# Patient Record
Sex: FEMALE | Race: WHITE | NOT HISPANIC OR LATINO | URBAN - METROPOLITAN AREA
[De-identification: names, ages, dates, MRNs, and addresses within clinical notes are randomized per-mention and may not be internally consistent; named-entity substitution may affect disease eponyms.]

---

## 2007-10-15 LAB
PAP RECOMMENDATION EXT: NORMAL
PAP SMEAR: NORMAL

## 2014-06-09 LAB — BCS RECOMMENDATION EXT: NORMAL

## 2017-11-15 ENCOUNTER — HISTORICAL (OUTPATIENT)
Dept: RADIOLOGY | Facility: HOSPITAL | Age: 37
End: 2017-11-15

## 2022-04-07 ENCOUNTER — HISTORICAL (OUTPATIENT)
Dept: ADMINISTRATIVE | Facility: HOSPITAL | Age: 42
End: 2022-04-07

## 2022-04-24 VITALS
SYSTOLIC BLOOD PRESSURE: 132 MMHG | BODY MASS INDEX: 25.2 KG/M2 | OXYGEN SATURATION: 100 % | WEIGHT: 142.19 LBS | DIASTOLIC BLOOD PRESSURE: 87 MMHG | HEIGHT: 63 IN

## 2022-08-23 ENCOUNTER — HOSPITAL ENCOUNTER (EMERGENCY)
Facility: HOSPITAL | Age: 42
Discharge: HOME | End: 2022-08-23
Attending: FAMILY MEDICINE
Payer: MEDICAID

## 2022-08-23 VITALS
OXYGEN SATURATION: 98 % | SYSTOLIC BLOOD PRESSURE: 130 MMHG | TEMPERATURE: 99 F | DIASTOLIC BLOOD PRESSURE: 80 MMHG | BODY MASS INDEX: 27.42 KG/M2 | HEIGHT: 62 IN | WEIGHT: 149 LBS | RESPIRATION RATE: 20 BRPM | HEART RATE: 85 BPM

## 2022-08-23 DIAGNOSIS — J01.00 ACUTE NON-RECURRENT MAXILLARY SINUSITIS: Primary | ICD-10-CM

## 2022-08-23 LAB
FLUAV AG UPPER RESP QL IA.RAPID: NOT DETECTED
FLUBV AG UPPER RESP QL IA.RAPID: NOT DETECTED
SARS-COV-2 RNA RESP QL NAA+PROBE: NOT DETECTED
STREP A PCR (OHS): NOT DETECTED

## 2022-08-23 PROCEDURE — 99284 EMERGENCY DEPT VISIT MOD MDM: CPT | Mod: 25

## 2022-08-23 PROCEDURE — 87631 RESP VIRUS 3-5 TARGETS: CPT | Performed by: FAMILY MEDICINE

## 2022-08-23 PROCEDURE — 63600175 PHARM REV CODE 636 W HCPCS: Performed by: FAMILY MEDICINE

## 2022-08-23 PROCEDURE — 96372 THER/PROPH/DIAG INJ SC/IM: CPT | Performed by: FAMILY MEDICINE

## 2022-08-23 PROCEDURE — 87636 SARSCOV2 & INF A&B AMP PRB: CPT | Performed by: FAMILY MEDICINE

## 2022-08-23 RX ORDER — CETIRIZINE HYDROCHLORIDE 10 MG/1
5 TABLET ORAL NIGHTLY
Qty: 20 TABLET | Refills: 0 | Status: SHIPPED | OUTPATIENT
Start: 2022-08-23 | End: 2022-09-12

## 2022-08-23 RX ORDER — CLINDAMYCIN HYDROCHLORIDE 300 MG/1
300 CAPSULE ORAL EVERY 6 HOURS
Qty: 28 CAPSULE | Refills: 0 | Status: SHIPPED | OUTPATIENT
Start: 2022-08-23 | End: 2022-08-30

## 2022-08-23 RX ORDER — KETOROLAC TROMETHAMINE 30 MG/ML
60 INJECTION, SOLUTION INTRAMUSCULAR; INTRAVENOUS
Status: COMPLETED | OUTPATIENT
Start: 2022-08-23 | End: 2022-08-23

## 2022-08-23 RX ADMIN — KETOROLAC TROMETHAMINE 60 MG: 30 INJECTION, SOLUTION INTRAMUSCULAR; INTRAVENOUS at 01:08

## 2022-08-23 NOTE — Clinical Note
"Beau Alvaradovalentino Johnson was seen and treated in our emergency department on 8/23/2022.  She may return to work on 08/25/2022.       If you have any questions or concerns, please don't hesitate to call.      Nadira Winters MD"

## 2022-08-23 NOTE — ED PROVIDER NOTES
Encounter Date: 8/23/2022       History     Chief Complaint   Patient presents with    Headache     C/o bilat ear pain, headache, sore throat, cough- non productive, body aches and fever highest 102 at home.      This pt is a 42 year old female with no significant PMHX... states taht she has been having nasal congestion, headache, fevr and body aches  For the pasr 4 days... works with the public.    The history is provided by the patient.   URI  The primary symptoms include fever, fatigue and headaches. The current episode started just prior to arrival. This is a new problem. The problem has not changed since onset.The fever began just prior to arrival. The fever has been unchanged since its onset. The fever has been present for less than 1 day. The temperature was taken by no thermometer. The maximum temperature recorded prior to her arrival was unknown.   The fatigue began today. The fatigue has been unchanged since its onset. The fatigue is worsened by emotional stress.   The headache began today. The headache developed suddenly. Headache is a new problem. The pain from the headache is at a severity of 5/10.   Symptoms associated with the illness include facial pain, sinus pressure, congestion and rhinorrhea.     Review of patient's allergies indicates:   Allergen Reactions    Codeine     Pcn [penicillins]      No past medical history on file.  No past surgical history on file.  No family history on file.  Social History     Tobacco Use    Smoking status: Current Every Day Smoker     Packs/day: 1.00     Types: Cigarettes    Smokeless tobacco: Never Used   Substance Use Topics    Alcohol use: Not Currently    Drug use: Never     Review of Systems   Constitutional: Positive for fatigue and fever.   HENT: Positive for congestion, rhinorrhea and sinus pressure.    Respiratory: Negative.    Cardiovascular: Negative.    Endocrine: Negative.    Neurological: Positive for headaches.   Psychiatric/Behavioral:  Negative.    All other systems reviewed and are negative.      Physical Exam     Initial Vitals [08/23/22 1220]   BP Pulse Resp Temp SpO2   129/82 86 20 98.8 °F (37.1 °C) 97 %      MAP       --         Physical Exam    Constitutional: She appears well-developed.   HENT:   Head: Normocephalic.   Nose: Mucosal edema and rhinorrhea present.   Mouth/Throat: Mucous membranes are normal. Posterior oropharyngeal erythema present.   Eyes: Pupils are equal, round, and reactive to light.   Neck:   Normal range of motion.  Cardiovascular: Normal rate, regular rhythm and normal heart sounds.   Pulmonary/Chest: Breath sounds normal.   Abdominal: Abdomen is soft.   Musculoskeletal:         General: Normal range of motion.      Cervical back: Normal range of motion.     Neurological: She is alert and oriented to person, place, and time.   Skin: Skin is warm and dry.   Psychiatric: She has a normal mood and affect.         ED Course   Procedures  Labs Reviewed   COVID/FLU A&B PCR - Normal   STREP GROUP A BY PCR - Normal          Imaging Results    None          Medications   ketorolac injection 60 mg (60 mg Intramuscular Given 8/23/22 1315)     Medical Decision Making:   Initial Assessment:   Upper resp infection- nasal congestion, fever and body aches.  Differential Diagnosis:   COVID, flu, sinusitis,   Clinical Tests:   Lab Tests: Ordered and Reviewed  ED Management:  COVID, flu and strep tests negative                      Clinical Impression:   Final diagnoses:  [J01.00] Acute non-recurrent maxillary sinusitis (Primary)          ED Disposition Condition    Discharge Stable        ED Prescriptions     Medication Sig Dispense Start Date End Date Auth. Provider    clindamycin (CLEOCIN) 300 MG capsule Take 1 capsule (300 mg total) by mouth every 6 (six) hours. for 7 days 28 capsule 8/23/2022 8/30/2022 Nadira Winters MD    cetirizine (ZYRTEC) 10 MG tablet Take 0.5 tablets (5 mg total) by mouth every evening. for 20 doses  20 tablet 8/23/2022 9/12/2022 Nadira Winters MD        Follow-up Information     Follow up With Specialties Details Why Contact Info    PCP  Schedule an appointment as soon as possible for a visit              Nadira Winters MD  08/23/22 2960

## 2022-11-16 ENCOUNTER — HOSPITAL ENCOUNTER (EMERGENCY)
Facility: HOSPITAL | Age: 42
Discharge: HOME | End: 2022-11-16
Attending: FAMILY MEDICINE
Payer: MEDICAID

## 2022-11-16 VITALS
BODY MASS INDEX: 27.42 KG/M2 | WEIGHT: 149 LBS | TEMPERATURE: 98 F | RESPIRATION RATE: 20 BRPM | DIASTOLIC BLOOD PRESSURE: 75 MMHG | HEIGHT: 62 IN | HEART RATE: 60 BPM | OXYGEN SATURATION: 100 % | SYSTOLIC BLOOD PRESSURE: 120 MMHG

## 2022-11-16 DIAGNOSIS — R11.10 VOMITING: ICD-10-CM

## 2022-11-16 DIAGNOSIS — N30.01 ACUTE CYSTITIS WITH HEMATURIA: ICD-10-CM

## 2022-11-16 DIAGNOSIS — J11.1 INFLUENZA: Primary | ICD-10-CM

## 2022-11-16 LAB
ALBUMIN SERPL-MCNC: 4.6 GM/DL (ref 3.5–5)
ALBUMIN/GLOB SERPL: 1.4 RATIO (ref 1.1–2)
ALP SERPL-CCNC: 61 UNIT/L (ref 40–150)
ALT SERPL-CCNC: 11 UNIT/L (ref 0–55)
APPEARANCE UR: ABNORMAL
AST SERPL-CCNC: 12 UNIT/L (ref 5–34)
BACTERIA #/AREA URNS AUTO: ABNORMAL /HPF
BASOPHILS # BLD AUTO: 0.04 X10(3)/MCL (ref 0–0.2)
BASOPHILS NFR BLD AUTO: 0.4 %
BILIRUB UR QL STRIP.AUTO: NEGATIVE MG/DL
BILIRUBIN DIRECT+TOT PNL SERPL-MCNC: 0.9 MG/DL
BUN SERPL-MCNC: 9 MG/DL (ref 7–18.7)
CALCIUM SERPL-MCNC: 9.9 MG/DL (ref 8.4–10.2)
CHLORIDE SERPL-SCNC: 102 MMOL/L (ref 98–107)
CO2 SERPL-SCNC: 27 MMOL/L (ref 22–29)
COLOR UR AUTO: YELLOW
CREAT SERPL-MCNC: 0.78 MG/DL (ref 0.55–1.02)
EOSINOPHIL # BLD AUTO: 0.26 X10(3)/MCL (ref 0–0.9)
EOSINOPHIL NFR BLD AUTO: 2.7 %
ERYTHROCYTE [DISTWIDTH] IN BLOOD BY AUTOMATED COUNT: 12.4 % (ref 11.5–17)
GFR SERPLBLD CREATININE-BSD FMLA CKD-EPI: >60 MLS/MIN/1.73/M2
GLOBULIN SER-MCNC: 3.4 GM/DL (ref 2.4–3.5)
GLUCOSE SERPL-MCNC: 97 MG/DL (ref 74–100)
GLUCOSE UR QL STRIP.AUTO: NEGATIVE MG/DL
HCT VFR BLD AUTO: 47.9 % (ref 37–47)
HGB BLD-MCNC: 16.7 GM/DL (ref 12–16)
IMM GRANULOCYTES # BLD AUTO: 0.02 X10(3)/MCL (ref 0–0.04)
IMM GRANULOCYTES NFR BLD AUTO: 0.2 %
KETONES UR QL STRIP.AUTO: NEGATIVE MG/DL
LEUKOCYTE ESTERASE UR QL STRIP.AUTO: NEGATIVE UNIT/L
LYMPHOCYTES # BLD AUTO: 3.47 X10(3)/MCL (ref 0.6–4.6)
LYMPHOCYTES NFR BLD AUTO: 36.1 %
MCH RBC QN AUTO: 31.4 PG (ref 27–31)
MCHC RBC AUTO-ENTMCNC: 34.9 MG/DL (ref 33–36)
MCV RBC AUTO: 90 FL (ref 80–94)
MONOCYTES # BLD AUTO: 0.93 X10(3)/MCL (ref 0.1–1.3)
MONOCYTES NFR BLD AUTO: 9.7 %
NEUTROPHILS # BLD AUTO: 4.9 X10(3)/MCL (ref 2.1–9.2)
NEUTROPHILS NFR BLD AUTO: 50.9 %
NITRITE UR QL STRIP.AUTO: POSITIVE
NRBC BLD AUTO-RTO: 0 %
PH UR STRIP.AUTO: 7.5 [PH]
PLATELET # BLD AUTO: 291 X10(3)/MCL (ref 130–400)
PMV BLD AUTO: 11 FL (ref 7.4–10.4)
POTASSIUM SERPL-SCNC: 3.7 MMOL/L (ref 3.5–5.1)
PROT SERPL-MCNC: 8 GM/DL (ref 6.4–8.3)
PROT UR QL STRIP.AUTO: NEGATIVE MG/DL
RBC # BLD AUTO: 5.32 X10(6)/MCL (ref 4.2–5.4)
RBC #/AREA URNS AUTO: ABNORMAL /HPF
RBC UR QL AUTO: ABNORMAL UNIT/L
SODIUM SERPL-SCNC: 143 MMOL/L (ref 136–145)
SP GR UR STRIP.AUTO: 1.01
SQUAMOUS #/AREA URNS AUTO: ABNORMAL /HPF
UROBILINOGEN UR STRIP-ACNC: 0.2 MG/DL
WBC # SPEC AUTO: 9.6 X10(3)/MCL (ref 4.5–11.5)
WBC #/AREA URNS AUTO: ABNORMAL /HPF

## 2022-11-16 PROCEDURE — 85025 COMPLETE CBC W/AUTO DIFF WBC: CPT | Performed by: FAMILY MEDICINE

## 2022-11-16 PROCEDURE — 63600175 PHARM REV CODE 636 W HCPCS: Performed by: FAMILY MEDICINE

## 2022-11-16 PROCEDURE — 93005 ELECTROCARDIOGRAM TRACING: CPT

## 2022-11-16 PROCEDURE — 96375 TX/PRO/DX INJ NEW DRUG ADDON: CPT

## 2022-11-16 PROCEDURE — 96374 THER/PROPH/DIAG INJ IV PUSH: CPT

## 2022-11-16 PROCEDURE — 80053 COMPREHEN METABOLIC PANEL: CPT | Performed by: FAMILY MEDICINE

## 2022-11-16 PROCEDURE — 81003 URINALYSIS AUTO W/O SCOPE: CPT | Performed by: FAMILY MEDICINE

## 2022-11-16 PROCEDURE — 99285 EMERGENCY DEPT VISIT HI MDM: CPT | Mod: 25

## 2022-11-16 PROCEDURE — 25000003 PHARM REV CODE 250: Performed by: FAMILY MEDICINE

## 2022-11-16 PROCEDURE — 81001 URINALYSIS AUTO W/SCOPE: CPT | Performed by: FAMILY MEDICINE

## 2022-11-16 RX ORDER — ONDANSETRON 2 MG/ML
4 INJECTION INTRAMUSCULAR; INTRAVENOUS
Status: COMPLETED | OUTPATIENT
Start: 2022-11-16 | End: 2022-11-16

## 2022-11-16 RX ORDER — SODIUM CHLORIDE 9 MG/ML
1000 INJECTION, SOLUTION INTRAVENOUS
Status: COMPLETED | OUTPATIENT
Start: 2022-11-16 | End: 2022-11-16

## 2022-11-16 RX ORDER — DIPHENHYDRAMINE HYDROCHLORIDE 50 MG/ML
12.5 INJECTION INTRAMUSCULAR; INTRAVENOUS ONCE
Status: COMPLETED | OUTPATIENT
Start: 2022-11-16 | End: 2022-11-16

## 2022-11-16 RX ORDER — CIPROFLOXACIN 500 MG/1
500 TABLET ORAL 2 TIMES DAILY
Qty: 14 TABLET | Refills: 0 | Status: SHIPPED | OUTPATIENT
Start: 2022-11-16 | End: 2022-11-23

## 2022-11-16 RX ORDER — PROCHLORPERAZINE EDISYLATE 5 MG/ML
5 INJECTION INTRAMUSCULAR; INTRAVENOUS ONCE
Status: COMPLETED | OUTPATIENT
Start: 2022-11-16 | End: 2022-11-16

## 2022-11-16 RX ORDER — ONDANSETRON 4 MG/1
4 TABLET, FILM COATED ORAL EVERY 6 HOURS
Qty: 12 TABLET | Refills: 0 | Status: SHIPPED | OUTPATIENT
Start: 2022-11-16

## 2022-11-16 RX ADMIN — PROCHLORPERAZINE EDISYLATE 5 MG: 5 INJECTION INTRAMUSCULAR; INTRAVENOUS at 03:11

## 2022-11-16 RX ADMIN — ONDANSETRON HYDROCHLORIDE 4 MG: 2 SOLUTION INTRAMUSCULAR; INTRAVENOUS at 03:11

## 2022-11-16 RX ADMIN — DIPHENHYDRAMINE HYDROCHLORIDE 12.5 MG: 50 INJECTION, SOLUTION INTRAMUSCULAR; INTRAVENOUS at 03:11

## 2022-11-16 RX ADMIN — SODIUM CHLORIDE 1000 ML: 9 INJECTION, SOLUTION INTRAVENOUS at 02:11

## 2022-11-16 NOTE — ED PROVIDER NOTES
Encounter Date: 11/16/2022       History     Chief Complaint   Patient presents with    Headache    Vomiting     Headache, nausea, vomiting, sweating x 1 hour.       This patient is a 42-year-old who comes in for vomiting.  Patient states that she was diagnosed with influenza exactly 1 week ago.  Patient has been vomiting x 1 hr and unable to hold anything down.  She states that she does have dysuria and a headache.    The history is provided by the patient.   Headache   This is a new problem. The current episode started today. The problem occurs constantly. The problem has been unchanged. The pain is located in the Bilateral region. The pain does not radiate. The pain quality is similar to prior headaches. The pain is at a severity of 6/10. Associated symptoms include vomiting. Nothing aggravates the symptoms. She has tried nothing for the symptoms. The treatment provided no relief.   Vomiting   This is a new problem. The current episode started 1 to 2 hours ago. The problem has been unchanged. The emesis has an appearance of stomach contents. Associated symptoms include headaches.   Review of patient's allergies indicates:   Allergen Reactions    Codeine     Pcn [penicillins]      No past medical history on file.  No past surgical history on file.  No family history on file.  Social History     Tobacco Use    Smoking status: Every Day     Packs/day: 1.00     Types: Cigarettes    Smokeless tobacco: Never   Substance Use Topics    Alcohol use: Not Currently    Drug use: Never     Review of Systems   Constitutional: Negative.    HENT: Negative.     Respiratory: Negative.     Cardiovascular: Negative.    Gastrointestinal:  Positive for vomiting.   Endocrine: Negative.    Neurological:  Positive for headaches.   Psychiatric/Behavioral: Negative.     All other systems reviewed and are negative.    Physical Exam     Initial Vitals [11/16/22 1409]   BP Pulse Resp Temp SpO2   (!) 166/115 78 18 97.9 °F (36.6 °C) 100 %       MAP       --         Physical Exam    Nursing note and vitals reviewed.  Constitutional: She appears well-developed.   HENT:   Head: Normocephalic.   Eyes: Pupils are equal, round, and reactive to light.   Neck:   Normal range of motion.  Cardiovascular:  Normal rate, regular rhythm and normal heart sounds.           Pulmonary/Chest: Breath sounds normal.   Abdominal: Abdomen is soft.   Musculoskeletal:         General: Normal range of motion.      Cervical back: Normal range of motion.     Neurological: She is alert and oriented to person, place, and time.   Skin: Skin is warm and dry.   Psychiatric: She has a normal mood and affect.       ED Course   Procedures  Labs Reviewed   URINALYSIS, REFLEX TO URINE CULTURE - Abnormal; Notable for the following components:       Result Value    Appearance, UA SL CLOUDY (*)     Blood, UA 1+ (*)     Nitrites, UA Positive (*)     All other components within normal limits   CBC WITH DIFFERENTIAL - Abnormal; Notable for the following components:    Hgb 16.7 (*)     Hct 47.9 (*)     MCH 31.4 (*)     MPV 11.0 (*)     All other components within normal limits   URINALYSIS, MICROSCOPIC - Abnormal; Notable for the following components:    Bacteria, UA Many (*)     Squamous Epithelial Cells, UA Few (*)     All other components within normal limits   CBC W/ AUTO DIFFERENTIAL    Narrative:     The following orders were created for panel order CBC auto differential.  Procedure                               Abnormality         Status                     ---------                               -----------         ------                     CBC with Differential[[id number]]        Abnormal            Final result                 Please view results for these tests on the individual orders.   COMPREHENSIVE METABOLIC PANEL     EKG Readings: (Independently Interpreted)   Initial Reading: No STEMI. Rhythm: Normal Sinus Rhythm. Heart Rate: 62. Ectopy: No Ectopy. Conduction: Normal. ST Segments:  Normal ST Segments. Axis: Normal. Q Waves: V1 and V2. Clinical Impression: Normal Sinus Rhythm   ECG Results              EKG 12-lead (In process)  Result time 11/16/22 15:41:03      In process by Interface, Lab In [facility] (11/16/22 15:41:03)                   Narrative:    Test Reason : R11.10,    Vent. Rate : 062 BPM     Atrial Rate : 062 BPM     P-R Int : 170 ms          QRS Dur : 082 ms      QT Int : 422 ms       P-R-T Axes : 077 055 029 degrees     QTc Int : 428 ms    Normal sinus rhythm  Septal infarct ,age undetermined  Abnormal ECG  No previous ECGs available    Referred By: [email or web address]   SELF           Confirmed By:                                   Imaging Results              CT Head Without Contrast (Final result)  Result time 11/16/22 14:54:15      Final result by Pita Sharma MD (11/16/22 14:54:15)                   Impression:      No acute abnormality seen      Electronically signed by: Pita Sharma  Date:    11/16/2022  Time:    14:54               Narrative:    EXAMINATION:  CT HEAD WITHOUT CONTRAST    CLINICAL HISTORY:  Headache, new or worsening (Age >= 50y);    TECHNIQUE:  Multiple axial images were obtained from the base of the brain to the vertex without contrast administration.  Sagittal and coronal reconstructions were performed. .Automatic exposure control  (AEC) is utilized to reduce patient radiation exposure.    COMPARISON:  None    FINDINGS:  There is no intracranial mass or lesion seen.  No hemorrhage is seen.  No infarct is seen.  The ventricles and basilar cisterns appear normal.  Brain parenchyma appears grossly unremarkable.    Posterior fossa appears normal.  The calvarium is intact.  The paranasal sinuses appear grossly unremarkable.                                       Medications   0.9%  NaCl infusion (0 mLs Intravenous Stopped 11/16/22 1519)   ondansetron injection 4 mg (4 mg Intravenous Given 11/16/22 1504)   prochlorperazine injection Soln 5 mg (5 mg  Intravenous Given 11/16/22 1504)   diphenhydrAMINE injection 12.5 mg (12.5 mg Intravenous Given 11/16/22 1504)     Medical Decision Making:   Initial Assessment:   Patient is a 42-year-old female was been having a food for the past week and now she started vomiting approximately 1 hour ago and can not stop vomiting.  She admits dysuria  Differential Diagnosis:   Urinary tract infection, flu  Clinical Tests:   Lab Tests: Ordered and Reviewed  ED Management:  Patient found to have a urinary tract infection                        Clinical Impression:   Final diagnoses:  [R11.10] Vomiting  [J11.1] Influenza (Primary)  [N30.01] Acute cystitis with hematuria        ED Disposition Condition    Discharge Stable          ED Prescriptions       Medication Sig Dispense Start Date End Date Auth. Provider    ciprofloxacin HCl (CIPRO) 500 MG tablet Take 1 tablet (500 mg total) by mouth 2 (two) times daily. for 7 days 14 tablet 11/16/2022 11/23/2022 Nadira Winters MD    ondansetron (ZOFRAN) 4 MG tablet Take 1 tablet (4 mg total) by mouth every 6 (six) hours. 12 tablet 11/16/2022 -- Nadira Winters MD          Follow-up Information       Follow up With Specialties Details Why Contact Info    PCP  Schedule an appointment as soon as possible for a visit                Nadira Winters MD  11/16/22 1860

## 2022-11-16 NOTE — Clinical Note
"Beau Alvaradovalentino Johnson was seen and treated in our emergency department on 11/16/2022.  She may return to work on 11/17/2022.       If you have any questions or concerns, please don't hesitate to call.      Nadira Winters MD"

## 2022-11-16 NOTE — ED NOTES
Patient ambulated to room 3 with steady gait.  Stated that she was at work at [employer] when she had a headache that hit her.  Pt vomited upon arrival to the ER.  Stated that she was screwing on the the cap of a medication vile when head ache hit.  Patient mentioned she was diagnosed with the flu last Wednesday and returned to work because she was feeling better.  Patient rates pain a 10/10 on pain scale.

## 2022-11-18 ENCOUNTER — PATIENT OUTREACH (OUTPATIENT)
Dept: EMERGENCY MEDICINE | Facility: HOSPITAL | Age: 42
End: 2022-11-18
Payer: MEDICAID

## 2022-11-18 NOTE — PROGRESS NOTES
Spoke to for post ED visit. Pt reports she is currently at the [facility] and offered pt to call her back at another time, pt reports that she can talk. Pt verbal consent to enroll in [facility] and continue f/u calls. Pt also reports that she came to [facility] for still with headaches and her b/p was still elevated and [facility] will give rx for b/p meds for a month until she can get in with her pcp and was not able to get appt with pcp due they are closed today and she will call on Monday to obtain f/u appt and she has not seen pcp. Advised pt to follow discharge instructions. Discussed medication and dash diet compliance, benefits of pcp and to call and obtain f/u appt, ED utilization and to utilize [facility] for non emergency issues when pcp is unavailable, dental/vision care and stressed importance of f/u care with pcp and all providers and ancillary care. Pt request to mail sodh education/resource discussed to her, verified pt address with pt. Pt voices understanding to instructions given and appreciation.     Providers seen within the last year:   Jorgito Ramirez MD at [facility]

## 2022-11-18 NOTE — PATIENT INSTRUCTIONS
Why Should I Have My Own Doctor or Nurse Practitioner (PCP) to Take Care of Me  What is a PCP (Primary Care Provider)?    A primary care provider is a doctor or nurse practitioner who you can call for an appointment and will see you when you are sick.    You will also be seen at scheduled appointment times during the year to check on your diabetes, or high blood pressure, or heart disease.    Why see the same PCP (doctor/nurse practitioner)?    You can be seen faster when you are sick           You, the PCP (doctor/nurse practitioner) and the office staff get to know each other; you begin to trust them to care for you. You take part in your health choices.   All of you together are a team.    Your medicine is looked at every time you visit, to be sure you are taking the medicine, as the PCP (doctor/nurse practitioner) ordered.    Your PCP (doctor/nurse practitioner) and their staff help keep you healthy and out of the hospital.  They can catch sicknesses earlier by ordering tests once a year to stop or prevent the sickness from getting worse.      Your PCP (doctor/nurse practitioner) can send you to providers who specialize (heart/bone/lung) if you need.  They and their office staff help keep track of your seeing other providers (doctors/nurse practitioners) and tests (CT/ MRIs/ X Rays)) taken.        PCPs want you to stay healthy.  Let us care for you.           Why is taking care of your mouth/teeth/gums important?     Your mouth is the opening to your body.  If not kept clean, it can let in sickness to the rest of your body.     Oral Health Care (Dentists)                 City:  Provider Address Phone Number Insurance Plan   [place]:  None available                    Kristofer Connelly,  [facility] Kristofer FORRESTER [phone]   ADULTS ONLY Medicaid:  HB & AETNA ONLY             [place]         [facility] ([facility]) [address] [phone]  DENTURES  ONLY ADULT Medicaid:  HB & AETNA ONLY             [facility] [address] [facility] [phone] All Medicaid/Medicare             Lux Cai, DDS [address]  [facility] Lux [address] [phone] Medicaid Children only 2 to 21             [facility] [address] [facility], [place] [phone] Medicare             [facility] [address], [place] [phone] Medicaid Children only 2 to 21             [facility] [address] [facility] [address], [place] [phone] Medicaid Children only 2 to 21             [facility]  [address], [place] [phone] Medicaid Children only 5 yrs and younger:  lip/tongue tie             [facility] [address] [facility] [phone] Medicaid Children only 2 to 21             [facility] [address] [facility] [phone] All Medicaid/Medicare :  ADULTS             [facility][1] [address] [facility] [phone] All Medicaid/Medicare :  ADULTS             [facility] [address] [facility] [phone] Medicaid Children only 2 to 21             [facility]  [address] [place] [phone] Medicaid Children only 2 to 21             Dr. Maricruz Osborn,  [facility] [phone] Medicare for Dentures Only             [facility], [facility] [address] , [place] [phone] All Medicaid/Medicare :   EXCEPT [facility]; ADULTS             [address] [phone]  UHC, IHC, HB, Aetna/Medicare :  ADULTS     [facility]; [place]: [phone]  [facility]; [place]: [phone]   DASH Meal Plan  (Healthy eating plan)    Why use this meal plan?    This healthy meal plan  lowers blood pressure.  This meal plan lowers the chance of you getting Diabetes, heart disease and stroke.  This meal plan also helps you lose weight.    DASH balanced meal plannin or more servings of fruits and vegetables every day.  At each meal, try to fill half of your plated with fruits and vegetables                    Eat 6-8 servings of whole grains every day.  Whole grains are:  Brown rice, oatmeal, whole wheat bread, whole grain, low salt cereals, Ashanti bread, whole wheat flour tortillas                5 ounces of meat, chicken, or fish each day (3-ounce size of serving of meat is the same size as a deck of playing cards)  Fish like sardines, salmon and mackerel are also good for your heart.            2 servings of low-fat dairy each day (Milk, cottage cheese, yogurt)          Do Not:  Use More than 1,500 milligrams of salt a day if you have high blood pressure (2/3 teaspoon)    You would look at labels and total milligrams of salt per day        Do Not Add salt when cooking      Do Not Salt food before eating    Do Not Eat fried foods  Do Not Cook using butter, stick margarine, lard, shortening, coconut oil    Do Not Buy regular canned vegetables, pickles, or olives (too much salt; use low salt or no salt)  Do Not Do not buy premade or frozen meals    Do Not Eat fast food/buffet           Speak with your Doctor/Nurse practitioner about exercising 30 minutes a day          Modified from DASH eating plan education       [facility] Medicaid Eye Clinics      [facility]  [address] [place] [address]  Phone: [phone]  (Accepts all Medicaid for visit and glasses)     [facility]  [address] [place] [address]  Phone: [phone]  (Only [facility] Medicaid for visit)  (All plans for glasses)     [facility]   [address] [place] [address]  Phone: [phone]  (All Medicaid plans for visit and glasses)  (Only 1 doctor that  "comes twice a month)        [facility]  [address] [address]  Phone: [phone]  (No Medicaid for visit)  (Aetna, [facility], [facility] Healthy Blue for glasses)    [facility]  [address] [address]  phone: [phone]  (Accepts all Medicaid)                 [facility]  [address] [address]  phone: [phone]  (Accepts [facility], [facility] and [facility] Medicaid)     [facility]  [address] [facility]  [place]; [address]  phone" [phone]  (Accepts all Medicaid).  "

## 2022-12-29 ENCOUNTER — PATIENT OUTREACH (OUTPATIENT)
Dept: EMERGENCY MEDICINE | Facility: HOSPITAL | Age: 42
End: 2022-12-29
Payer: MEDICAID

## 2022-12-29 NOTE — PROGRESS NOTES
Spoke to pt for f/u, doing well. Discussed medication and dash diet compliance, upcoming appts, ED utilization and stressed importance of f/u care with pcp and all providers and ancillary care. Encouraged pt to monitor and log b/p and bring to provider appts and benefits of doing so. Pt denies any problems. Pt reports she did received mail sent to her on [place] education/resources sent to her. Pt reports she has appt with her pcp at [facility] on tomorrow 12/30/22 and eye clinic appt sometimes next month with [facility] and still is waiting to obtain a dental appt, pt reports no available appts at this time, but will continue to call for appt.    Appointment:  Pcp with [facility] 12/30/22  [facility] appt 1/2023     Providers seen within the last year:   Jorgito Ramirez MD at [facility]

## 2023-01-26 ENCOUNTER — PATIENT OUTREACH (OUTPATIENT)
Dept: EMERGENCY MEDICINE | Facility: HOSPITAL | Age: 43
End: 2023-01-26
Payer: MEDICAID

## 2023-01-26 NOTE — PROGRESS NOTES
Spoke to pt for f/u, doing well, no complaints. Pt reports she did attend her pcp appt on 12/30/22 and next visit is scheduled on 2/1/23 and pcp told her she does not have high blood pressure and took her off the b/p med that she was taking, put her on Vitamin D and Depression medication and that she is taking and doing well, she also reports going through a divorce, got a new place to stay and she will apply for snap, she is working and is a recovering addict. Offered pt assistance in referring to [place] for assistance in applying for snap or any sdoh issues she may need assistance with, pt reports does not wish for assistance at this time, she will apply for snap herself and she is doing ok. Words of encouragement given to pt and to call for non urgent issues. Pt denies si/hi and mental health crisis. Discussed medication and dash diet compliance, pt reports she will continue dash diet, ED utilization, upcoming appt, oral/vision care and stressed importance of f/u care with pcp and all providers and ancillary care. Pt reports she will call and verify and obtain appt with eye clinic and dentist. Pt voices understanding to instructions given and appreciation.     Appointment:  Pcp with [facility] lv-12/30/22, nv-2/1/23  [facility] app[facility] 1/2023 pt to call for appt     Providers seen within the last year:   Jorgito Ramirez MD at [facility]

## 2023-01-31 ENCOUNTER — PATIENT OUTREACH (OUTPATIENT)
Dept: EMERGENCY MEDICINE | Facility: HOSPITAL | Age: 43
End: 2023-01-31
Payer: MEDICAID

## 2023-01-31 NOTE — PROGRESS NOTES
attempt to reach out to pt for reminder of appt with pcp on 2/1/23, vm not set up, unable to leave a message

## 2023-02-23 ENCOUNTER — PATIENT OUTREACH (OUTPATIENT)
Dept: EMERGENCY MEDICINE | Facility: HOSPITAL | Age: 43
End: 2023-02-23
Payer: MEDICAID

## 2023-03-08 ENCOUNTER — DOCUMENTATION ONLY (OUTPATIENT)
Dept: ADMINISTRATIVE | Facility: HOSPITAL | Age: 43
End: 2023-03-08
Payer: MEDICAID

## 2024-03-04 ENCOUNTER — HOSPITAL ENCOUNTER (EMERGENCY)
Facility: HOSPITAL | Age: 44
Discharge: HOME | End: 2024-03-04
Attending: STUDENT IN AN ORGANIZED HEALTH CARE EDUCATION/TRAINING PROGRAM
Payer: COMMERCIAL

## 2024-03-04 VITALS
TEMPERATURE: 98 F | OXYGEN SATURATION: 100 % | DIASTOLIC BLOOD PRESSURE: 79 MMHG | BODY MASS INDEX: 27.42 KG/M2 | SYSTOLIC BLOOD PRESSURE: 146 MMHG | HEART RATE: 81 BPM | WEIGHT: 149 LBS | RESPIRATION RATE: 20 BRPM | HEIGHT: 62 IN

## 2024-03-04 DIAGNOSIS — U07.1 COVID-19: Primary | ICD-10-CM

## 2024-03-04 LAB
FLUAV AG UPPER RESP QL IA.RAPID: NOT DETECTED
FLUBV AG UPPER RESP QL IA.RAPID: NOT DETECTED
SARS-COV-2 RNA RESP QL NAA+PROBE: DETECTED
STREP A PCR (OHS): NOT DETECTED

## 2024-03-04 PROCEDURE — 99283 EMERGENCY DEPT VISIT LOW MDM: CPT | Mod: 25

## 2024-03-04 PROCEDURE — 0240U COVID/FLU A&B PCR: CPT | Performed by: STUDENT IN AN ORGANIZED HEALTH CARE EDUCATION/TRAINING PROGRAM

## 2024-03-04 PROCEDURE — 87651 STREP A DNA AMP PROBE: CPT | Performed by: STUDENT IN AN ORGANIZED HEALTH CARE EDUCATION/TRAINING PROGRAM

## 2024-03-04 RX ORDER — LURASIDONE HYDROCHLORIDE 20 MG/1
20 TABLET, FILM COATED ORAL
COMMUNITY

## 2024-03-04 RX ORDER — HYDROXYZINE PAMOATE 25 MG/1
25 CAPSULE ORAL 2 TIMES DAILY PRN
COMMUNITY
Start: 2024-02-21 | End: 2024-04-22 | Stop reason: ALTCHOICE

## 2024-03-04 NOTE — Clinical Note
"Beau Alvaradochristina" Alex was seen and treated in our emergency department on 3/4/2024.  She may return to work on 03/06/2024.       If you have any questions or concerns, please don't hesitate to call.      Gopi Dobbins MD"
"Beau Alvaradochristina" Alex was seen and treated in our emergency department on 3/4/2024.  She may return to work on 03/06/2024.       If you have any questions or concerns, please don't hesitate to call.      Gopi Dobbins MD"
Discharged

## 2024-03-04 NOTE — ED PROVIDER NOTES
Encounter Date: 3/4/2024       History     Chief Complaint   Patient presents with    Otalgia    Sore Throat    Chills     Bilateral ear pain, sore throat and chills, body aches since Tuesday.       Patient is a 43-year-old white female with a past medical history bipolar disorder who presented to the ER today due to myalgias, sinus congestion, rhinorrhea, sore throat and a nonproductive cough for the last 7 days.  She has been taking over-the-counter remedies but states that symptoms have not improved.  She denies any fevers, chills chest pain, shortness of breath or abdominal pain.      Review of patient's allergies indicates:   Allergen Reactions    Codeine     Pcn [penicillins]      History reviewed. No pertinent past medical history.  History reviewed. No pertinent surgical history.  History reviewed. No pertinent family history.  Social History     Tobacco Use    Smoking status: Every Day     Current packs/day: 1.00     Types: Cigarettes    Smokeless tobacco: Never   Substance Use Topics    Alcohol use: Not Currently    Drug use: Never     Review of Systems   Constitutional:  Negative for chills, fatigue and fever.   HENT:  Positive for congestion, rhinorrhea and sore throat. Negative for trouble swallowing.    Eyes:  Negative for pain and visual disturbance.   Respiratory:  Positive for cough. Negative for shortness of breath and wheezing.    Cardiovascular:  Negative for chest pain and palpitations.   Gastrointestinal:  Negative for abdominal pain, blood in stool, constipation, diarrhea, nausea and vomiting.   Genitourinary:  Negative for dysuria and hematuria.   Musculoskeletal:  Positive for myalgias. Negative for back pain.   Skin:  Negative for rash and wound.   Neurological:  Negative for seizures, syncope and headaches.   Psychiatric/Behavioral:  Negative for confusion. The patient is not nervous/anxious.        Physical Exam     Initial Vitals [03/04/24 1230]   BP Pulse Resp Temp SpO2   (!) 148/79 83  20 97.9 °F (36.6 °C) 100 %      MAP       --         Physical Exam    Nursing note and vitals reviewed.  Constitutional: She appears well-developed and well-nourished. She is not diaphoretic. No distress.   HENT:   Head: Normocephalic.   Right Ear: External ear normal.   Left Ear: External ear normal.   Nose: Nose normal.   Ears:  TMs clear gray positive light reflex no erythema      Eyes: Conjunctivae and EOM are normal. Right eye exhibits no discharge. Left eye exhibits no discharge. No scleral icterus.   Neck:   Normal range of motion.  Cardiovascular:  Normal rate, regular rhythm and normal heart sounds.     Exam reveals no gallop and no friction rub.       No murmur heard.  Pulmonary/Chest: Breath sounds normal. No stridor. No respiratory distress. She has no wheezes. She has no rhonchi. She has no rales.   Musculoskeletal:         General: Normal range of motion.      Cervical back: Normal range of motion.     Neurological: She is alert.   Skin: Skin is warm. No rash noted. No erythema.   Psychiatric: She has a normal mood and affect. Her behavior is normal.         ED Course   Procedures  Labs Reviewed   COVID/FLU A&B PCR - Abnormal; Notable for the following components:       Result Value    SARS-CoV-2 PCR Detected (*)     All other components within normal limits    Narrative:     The Xpert Xpress SARS-CoV-2/FLU/RSV plus is a rapid, multiplexed real-time PCR test intended for the simultaneous qualitative detection and differentiation of SARS-CoV-2, Influenza A, Influenza B, and respiratory syncytial virus (RSV) viral RNA in either nasopharyngeal swab or nasal swab specimens.         STREP GROUP A BY PCR - Normal    Narrative:     The Xpert Xpress Strep A test is a rapid, qualitative in vitro diagnostic test for the detection of Streptococcus pyogenes (Group A ß-hemolytic Streptococcus, Strep A) in throat swab specimens from patients with signs and symptoms of pharyngitis.            Imaging Results               X-Ray Chest 1 View (Final result)  Result time 03/04/24 13:44:39      Final result by Franklin Velarde MD (03/04/24 13:44:39)                   Impression:      No acute chest disease is identified.      Electronically signed by: Franklin Velarde  Date:    03/04/2024  Time:    13:44               Narrative:    EXAMINATION:  XR CHEST 1 VIEW    CLINICAL HISTORY:  cough;, .    FINDINGS:  No alveolar consolidation, effusion, or pneumothorax is seen.   The thoracic aorta is normal  cardiac silhouette, central pulmonary vessels and mediastinum are normal in size and are grossly unremarkable.   visualized osseous structures are grossly unremarkable.                                       Medications - No data to display  Medical Decision Making  Differentials:  COVID, flu, viral URI, pneumonia  History in his the patient   Patient is a 43-year-old well-appearing female with stable vital signs who presented to the ER with a upper respiratory type complaints.  Chest x-ray showed no acute process and lungs are clear to auscultation bilaterally lowering suspicion for pneumonia.  COVID test is positive but flu test is negative.  CDC guidelines regarding COVID were discussed.  Symptomatic care was recommended.  All questions were answered in layman's terms and return precautions were discussed.    Amount and/or Complexity of Data Reviewed  Labs: ordered. Decision-making details documented in ED Course.  Radiology: ordered. Decision-making details documented in ED Course.                                      Clinical Impression:  Final diagnoses:  [U07.1] COVID-19 (Primary)          ED Disposition Condition    Discharge Stable          ED Prescriptions    None       Follow-up Information       Follow up With Specialties Details Why Contact Info    [facility] - Emergency Dept Emergency Medicine  If symptoms worsen [address] [place] [id number] [phone]             Gopi Dobbins MD  03/04/24  [id number]

## 2024-04-22 ENCOUNTER — HOSPITAL ENCOUNTER (EMERGENCY)
Facility: HOSPITAL | Age: 44
Discharge: HOME | End: 2024-04-22
Attending: STUDENT IN AN ORGANIZED HEALTH CARE EDUCATION/TRAINING PROGRAM
Payer: COMMERCIAL

## 2024-04-22 VITALS
OXYGEN SATURATION: 98 % | BODY MASS INDEX: 27.05 KG/M2 | WEIGHT: 147 LBS | DIASTOLIC BLOOD PRESSURE: 90 MMHG | HEART RATE: 90 BPM | HEIGHT: 62 IN | RESPIRATION RATE: 20 BRPM | SYSTOLIC BLOOD PRESSURE: 135 MMHG | TEMPERATURE: 98 F

## 2024-04-22 DIAGNOSIS — R11.2 NAUSEA & VOMITING: ICD-10-CM

## 2024-04-22 DIAGNOSIS — G43.809 OTHER MIGRAINE WITHOUT STATUS MIGRAINOSUS, NOT INTRACTABLE: Primary | ICD-10-CM

## 2024-04-22 LAB
ALBUMIN SERPL-MCNC: 4.3 G/DL (ref 3.5–5)
ALBUMIN/GLOB SERPL: 1.1 RATIO (ref 1.1–2)
ALP SERPL-CCNC: 107 UNIT/L (ref 40–150)
ALT SERPL-CCNC: 26 UNIT/L (ref 0–55)
APPEARANCE UR: ABNORMAL
AST SERPL-CCNC: 21 UNIT/L (ref 5–34)
B-HCG SERPL QL: NEGATIVE
BACTERIA #/AREA URNS AUTO: ABNORMAL /HPF
BASOPHILS # BLD AUTO: 0.02 X10(3)/MCL
BASOPHILS NFR BLD AUTO: 0.2 %
BILIRUB SERPL-MCNC: 0.8 MG/DL
BILIRUB UR QL STRIP.AUTO: NEGATIVE
BUN SERPL-MCNC: 16 MG/DL (ref 7–18.7)
CALCIUM SERPL-MCNC: 10.2 MG/DL (ref 8.4–10.2)
CHLORIDE SERPL-SCNC: 104 MMOL/L (ref 98–107)
CO2 SERPL-SCNC: 26 MMOL/L (ref 22–29)
COLOR UR AUTO: YELLOW
CREAT SERPL-MCNC: 0.74 MG/DL (ref 0.55–1.02)
EOSINOPHIL # BLD AUTO: 0.07 X10(3)/MCL (ref 0–0.9)
EOSINOPHIL NFR BLD AUTO: 0.6 %
ERYTHROCYTE [DISTWIDTH] IN BLOOD BY AUTOMATED COUNT: 11.9 % (ref 11.5–17)
GFR SERPLBLD CREATININE-BSD FMLA CKD-EPI: >60 MLS/MIN/1.73/M2
GLOBULIN SER-MCNC: 3.8 GM/DL (ref 2.4–3.5)
GLUCOSE SERPL-MCNC: 95 MG/DL (ref 74–100)
GLUCOSE UR QL STRIP.AUTO: NEGATIVE
HCT VFR BLD AUTO: 45.5 % (ref 37–47)
HGB BLD-MCNC: 15.5 G/DL (ref 12–16)
IMM GRANULOCYTES # BLD AUTO: 0.03 X10(3)/MCL (ref 0–0.04)
IMM GRANULOCYTES NFR BLD AUTO: 0.3 %
KETONES UR QL STRIP.AUTO: NEGATIVE
LEUKOCYTE ESTERASE UR QL STRIP.AUTO: NEGATIVE
LIPASE SERPL-CCNC: 23 U/L
LYMPHOCYTES # BLD AUTO: 1.18 X10(3)/MCL (ref 0.6–4.6)
LYMPHOCYTES NFR BLD AUTO: 10.5 %
MCH RBC QN AUTO: 28.7 PG (ref 27–31)
MCHC RBC AUTO-ENTMCNC: 34.1 G/DL (ref 33–36)
MCV RBC AUTO: 84.1 FL (ref 80–94)
MONOCYTES # BLD AUTO: 0.7 X10(3)/MCL (ref 0.1–1.3)
MONOCYTES NFR BLD AUTO: 6.2 %
MUCOUS THREADS URNS QL MICRO: ABNORMAL /LPF
NEUTROPHILS # BLD AUTO: 9.28 X10(3)/MCL (ref 2.1–9.2)
NEUTROPHILS NFR BLD AUTO: 82.2 %
NITRITE UR QL STRIP.AUTO: NEGATIVE
NRBC BLD AUTO-RTO: 0 %
PH UR STRIP.AUTO: 5.5 [PH]
PLATELET # BLD AUTO: 255 X10(3)/MCL (ref 130–400)
PMV BLD AUTO: 11.4 FL (ref 7.4–10.4)
POTASSIUM SERPL-SCNC: 4.1 MMOL/L (ref 3.5–5.1)
PROT SERPL-MCNC: 8.1 GM/DL (ref 6.4–8.3)
PROT UR QL STRIP.AUTO: NEGATIVE
RBC # BLD AUTO: 5.41 X10(6)/MCL (ref 4.2–5.4)
RBC #/AREA URNS AUTO: ABNORMAL /HPF
RBC UR QL AUTO: ABNORMAL
SODIUM SERPL-SCNC: 140 MMOL/L (ref 136–145)
SP GR UR STRIP.AUTO: >=1.03 (ref 1–1.03)
SQUAMOUS #/AREA URNS AUTO: ABNORMAL /HPF
UROBILINOGEN UR STRIP-ACNC: 0.2
WBC # SPEC AUTO: 11.28 X10(3)/MCL (ref 4.5–11.5)
WBC #/AREA URNS AUTO: ABNORMAL /HPF

## 2024-04-22 PROCEDURE — 81001 URINALYSIS AUTO W/SCOPE: CPT | Performed by: STUDENT IN AN ORGANIZED HEALTH CARE EDUCATION/TRAINING PROGRAM

## 2024-04-22 PROCEDURE — 25000003 PHARM REV CODE 250: Performed by: STUDENT IN AN ORGANIZED HEALTH CARE EDUCATION/TRAINING PROGRAM

## 2024-04-22 PROCEDURE — 96372 THER/PROPH/DIAG INJ SC/IM: CPT | Performed by: STUDENT IN AN ORGANIZED HEALTH CARE EDUCATION/TRAINING PROGRAM

## 2024-04-22 PROCEDURE — 63600175 PHARM REV CODE 636 W HCPCS: Performed by: STUDENT IN AN ORGANIZED HEALTH CARE EDUCATION/TRAINING PROGRAM

## 2024-04-22 PROCEDURE — 99284 EMERGENCY DEPT VISIT MOD MDM: CPT | Mod: 25

## 2024-04-22 PROCEDURE — 81003 URINALYSIS AUTO W/O SCOPE: CPT | Performed by: STUDENT IN AN ORGANIZED HEALTH CARE EDUCATION/TRAINING PROGRAM

## 2024-04-22 PROCEDURE — 83690 ASSAY OF LIPASE: CPT | Performed by: STUDENT IN AN ORGANIZED HEALTH CARE EDUCATION/TRAINING PROGRAM

## 2024-04-22 PROCEDURE — 81025 URINE PREGNANCY TEST: CPT | Performed by: STUDENT IN AN ORGANIZED HEALTH CARE EDUCATION/TRAINING PROGRAM

## 2024-04-22 PROCEDURE — 85025 COMPLETE CBC W/AUTO DIFF WBC: CPT | Performed by: STUDENT IN AN ORGANIZED HEALTH CARE EDUCATION/TRAINING PROGRAM

## 2024-04-22 PROCEDURE — 80053 COMPREHEN METABOLIC PANEL: CPT | Performed by: STUDENT IN AN ORGANIZED HEALTH CARE EDUCATION/TRAINING PROGRAM

## 2024-04-22 RX ORDER — KETOROLAC TROMETHAMINE 30 MG/ML
30 INJECTION, SOLUTION INTRAMUSCULAR; INTRAVENOUS
Status: COMPLETED | OUTPATIENT
Start: 2024-04-22 | End: 2024-04-22

## 2024-04-22 RX ORDER — ONDANSETRON 4 MG/1
4 TABLET, ORALLY DISINTEGRATING ORAL
Status: COMPLETED | OUTPATIENT
Start: 2024-04-22 | End: 2024-04-22

## 2024-04-22 RX ORDER — BUTALBITAL, ACETAMINOPHEN AND CAFFEINE 50; 325; 40 MG/1; MG/1; MG/1
1 TABLET ORAL EVERY 4 HOURS PRN
Qty: 10 TABLET | Refills: 0 | Status: SHIPPED | OUTPATIENT
Start: 2024-04-22 | End: 2024-05-22

## 2024-04-22 RX ORDER — ZOLPIDEM TARTRATE 10 MG/1
10 TABLET ORAL NIGHTLY PRN
COMMUNITY
Start: 2024-04-19

## 2024-04-22 RX ADMIN — KETOROLAC TROMETHAMINE 30 MG: 30 INJECTION, SOLUTION INTRAMUSCULAR at 03:04

## 2024-04-22 RX ADMIN — ONDANSETRON 4 MG: 4 TABLET, ORALLY DISINTEGRATING ORAL at 03:04

## 2024-04-22 NOTE — ED PROVIDER NOTES
Encounter Date: 4/22/2024       History     Chief Complaint   Patient presents with    Headache    Vomiting     Headache started this am with vomiting.      Pt is a 42 yo white female with no significant past medical history presented to the ER today after leaving work due to nausea and headache.  She states that she was headaches quite frequently which often times resolved with ibuprofen.  She states she had several episodes of nonbloody nonbilious emesis today associated with the abdominal cramping which prompted hurts leave working presents to the emergency room.  She states her headache is significantly improved and denies it being the worst headache her life.  In addition she also denies any nuchal rigidity, diplopia, dysarthria, dysphagia, focal deficits or changes in sensation.  She denies any notable abdominal pain and states that after vomiting she had some epigastric discomfort.  She denies any diarrhea/constipation, dysuria or hematuria.  She denies any other complaints.      Review of patient's allergies indicates:   Allergen Reactions    Codeine     Pcn [penicillins]      No past medical history on file.  No past surgical history on file.  No family history on file.  Social History     Tobacco Use    Smoking status: Every Day     Current packs/day: 1.00     Types: Cigarettes    Smokeless tobacco: Never   Substance Use Topics    Alcohol use: Not Currently    Drug use: Never     Review of Systems   Constitutional:  Negative for chills, fatigue and fever.   HENT:  Negative for congestion, sore throat and trouble swallowing.    Eyes:  Negative for pain and visual disturbance.   Respiratory:  Negative for cough, shortness of breath and wheezing.    Cardiovascular:  Negative for chest pain and palpitations.   Gastrointestinal:  Positive for nausea and vomiting. Negative for abdominal pain, blood in stool, constipation and diarrhea.   Genitourinary:  Negative for dysuria and hematuria.   Musculoskeletal:   Negative for back pain and myalgias.   Skin:  Negative for rash and wound.   Neurological:  Positive for headaches. Negative for seizures and syncope.   Psychiatric/Behavioral:  Negative for confusion. The patient is not nervous/anxious.        Physical Exam     Initial Vitals [04/22/24 1458]   BP Pulse Resp Temp SpO2   (!) 143/105 95 18 98.1 °F (36.7 °C) 98 %      MAP       --         Physical Exam    Nursing note and vitals reviewed.  Constitutional: She appears well-developed and well-nourished. She is not diaphoretic. No distress.   HENT:   Head: Normocephalic.   Right Ear: External ear normal.   Left Ear: External ear normal.   Nose: Nose normal.   Eyes: Conjunctivae and EOM are normal. Right eye exhibits no discharge. Left eye exhibits no discharge. No scleral icterus.   Neck:   Normal range of motion.  Cardiovascular:  Normal rate, regular rhythm and normal heart sounds.     Exam reveals no gallop and no friction rub.       No murmur heard.  Pulmonary/Chest: Breath sounds normal. No stridor. No respiratory distress. She has no wheezes. She has no rhonchi. She has no rales.   Abdominal: Abdomen is soft. She exhibits no distension. There is no abdominal tenderness.   Negative Pittman sign.  No pain at McBurney's point.  Abdomen is soft diffusely.  No rebound tenderness or guarding. There is no rebound and no guarding.   Musculoskeletal:         General: Normal range of motion.      Cervical back: Normal range of motion.     Neurological: She is alert.   Skin: Skin is warm. No rash noted. No erythema.   Psychiatric: She has a normal mood and affect. Her behavior is normal.         ED Course   Procedures  Labs Reviewed   COMPREHENSIVE METABOLIC PANEL - Abnormal; Notable for the following components:       Result Value    Globulin 3.8 (*)     All other components within normal limits   URINALYSIS, REFLEX TO URINE CULTURE - Abnormal; Notable for the following components:    Appearance, UA SL CLOUDY (*)     Blood, UA  2+ (*)     All other components within normal limits   CBC WITH DIFFERENTIAL - Abnormal; Notable for the following components:    RBC 5.41 (*)     MPV 11.4 (*)     Neut # 9.28 (*)     All other components within normal limits   URINALYSIS, MICROSCOPIC - Abnormal; Notable for the following components:    Bacteria, UA Moderate (*)     Mucous, UA Small (*)     RBC, UA 6-10 (*)     Squamous Epithelial Cells, UA Moderate (*)     All other components within normal limits   LIPASE - Normal   HCG QUALITATIVE URINE - Normal   CBC W/ AUTO DIFFERENTIAL    Narrative:     The following orders were created for panel order CBC Auto Differential.  Procedure                               Abnormality         Status                     ---------                               -----------         ------                     CBC with Differential[[id number]]       Abnormal            Final result                 Please view results for these tests on the individual orders.          Imaging Results              X-Ray Abdomen AP 1 View (KUB) (Final result)  Result time 04/22/24 16:35:31      Final result by Sandor Howard MD (04/22/24 16:35:31)                   Impression:      Nonobstructive bowel gas pattern.      Electronically signed by: Sandor Howard  Date:    04/22/2024  Time:    16:35               Narrative:    EXAMINATION:  XR ABDOMEN AP 1 VIEW    CLINICAL HISTORY:  Nausea with vomiting, unspecified    TECHNIQUE:  AP View(s) of the abdomen.    COMPARISON:  None    FINDINGS:  No dilated bowel identified.  Moderate volume stool.  No defined calcifications over the kidneys.  Umbilical piercing.                                       Medications   ondansetron disintegrating tablet 4 mg (4 mg Oral Given 4/22/24 0944)   ketorolac injection 30 mg (30 mg Intramuscular Given 4/22/24 1556)     Medical Decision Making  Differentials:  Viral gastroenteritis, migraine, intracranial bleed, obstruction   Historian is the patient   Overall  well-appearing 43-year-old female who drove herself here presents with nausea and vomiting after a headache.  She denies any red flag symptoms of a migraine.  She states her headache is actually improving at this point after taking ibuprofen.  Abdominal exam is unremarkable as well.  Basic labs showed no leukocytosis or electrolyte disturbance.  It also shows no evidence of dehydration.  X-ray flat and erect shows no obstruction and urine studies were unremarkable with a negative UPT.  Toradol was given here along with Zofran and symptoms completely resolved.  Migraine is suspected to be the cause of the nausea and vomiting.  Advised patient close follow up with the PCP and Fioricet was sent to the pharmacy.  ER return precautions were discussed and all questions answered in layman's terms.    Amount and/or Complexity of Data Reviewed  Labs: ordered. Decision-making details documented in ED Course.  Radiology: ordered. Decision-making details documented in ED Course.    Risk  Prescription drug management.                                      Clinical Impression:  Final diagnoses:  [R11.2] Nausea & vomiting  [G43.809] Other migraine without status migrainosus, not intractable (Primary)          ED Disposition Condition    Discharge Stable          ED Prescriptions       Medication Sig Dispense Start Date End Date Auth. Provider    butalbital-acetaminophen-caffeine -40 mg (FIORICET, ESGIC) -40 mg per tablet Take 1 tablet by mouth every 4 (four) hours as needed for Pain. 10 tablet 4/22/2024 5/22/2024 Gopi Dobbins MD          Follow-up Information       Follow up With Specialties Details Why Contact Info    [facility] Emergency [facility]  If symptoms worsen [address] [facility] [id number] [phone]             Gopi Dobbins MD  04/22/24 [id number]

## 2024-04-22 NOTE — ED NOTES
Patient ambulated to ER room 1 with steady gait.  Reports that she started having a headache this morning and vomited 6 times.  Patient mentions abd pain to the doctor upon exam, did not mention symptoms of stomach pain to nursing staff. Rates headache  8/10 on pain scale.

## 2024-04-22 NOTE — Clinical Note
"Beau Alvaradochristina" Alex was seen and treated in our emergency department on 4/22/2024.  She may return to work on 04/23/2024.       If you have any questions or concerns, please don't hesitate to call.      Gopi Dobbins MD"